# Patient Record
Sex: MALE | Race: WHITE
[De-identification: names, ages, dates, MRNs, and addresses within clinical notes are randomized per-mention and may not be internally consistent; named-entity substitution may affect disease eponyms.]

---

## 2018-10-10 ENCOUNTER — HOSPITAL ENCOUNTER (OUTPATIENT)
Dept: HOSPITAL 47 - RADXRYALE | Age: 23
Discharge: HOME | End: 2018-10-10
Attending: INTERNAL MEDICINE
Payer: COMMERCIAL

## 2018-10-10 DIAGNOSIS — M54.5: Primary | ICD-10-CM

## 2018-10-10 PROCEDURE — 72110 X-RAY EXAM L-2 SPINE 4/>VWS: CPT

## 2018-10-10 NOTE — XR
EXAMINATION TYPE: XR lumbosacral spine min 4V

 

DATE OF EXAM: 10/10/2018

 

CLINICAL HISTORY: Low back pain for one week. No known injury.

 

TECHNIQUE: Frontal, lateral, and oblique images of the lumbar spine are obtained.

 

COMPARISON: None

 

FINDINGS:  There are 5 lumbar type vertebral bodies identified.  The lumbar spine shows satisfactory 
alignment without evidence of acute fracture or dislocation. Vertebral body heights and disk space he
ights are within normal limits.   The oblique images appear within normal limits although bowel overl
ies the pars interarticularis somewhat limiting evaluation for defects.  The overlying soft tissue ap
pears unremarkable.

 

IMPRESSION:  No acute fracture or malalignment is seen in the lumbar spine.

## 2020-08-06 ENCOUNTER — HOSPITAL ENCOUNTER (EMERGENCY)
Dept: HOSPITAL 47 - EC | Age: 25
LOS: 1 days | Discharge: HOME | End: 2020-08-07
Payer: COMMERCIAL

## 2020-08-06 DIAGNOSIS — R11.2: ICD-10-CM

## 2020-08-06 DIAGNOSIS — R10.9: Primary | ICD-10-CM

## 2020-08-06 DIAGNOSIS — R31.9: ICD-10-CM

## 2020-08-06 PROCEDURE — 81001 URINALYSIS AUTO W/SCOPE: CPT

## 2020-08-06 PROCEDURE — 82365 CALCULUS SPECTROSCOPY: CPT

## 2020-08-06 PROCEDURE — 99284 EMERGENCY DEPT VISIT MOD MDM: CPT

## 2020-08-07 VITALS
DIASTOLIC BLOOD PRESSURE: 85 MMHG | RESPIRATION RATE: 18 BRPM | TEMPERATURE: 98.2 F | HEART RATE: 73 BPM | SYSTOLIC BLOOD PRESSURE: 119 MMHG

## 2020-08-07 LAB
PH UR: 6.5 [PH] (ref 5–8)
RBC UR QL: >182 /HPF (ref 0–5)
SP GR UR: 1.02 (ref 1–1.03)
UROBILINOGEN UR QL STRIP: <2 MG/DL (ref ?–2)
WBC # UR AUTO: 2 /HPF (ref 0–5)

## 2020-08-07 NOTE — ED
General Adult HPI





- General


Chief complaint: Urogenital


Stated complaint: Abdominal pain, vomiting


Time Seen by Provider: 08/07/20 00:15


Source: patient, RN notes reviewed, old records reviewed


Mode of arrival: ambulatory


Limitations: no limitations





- History of Present Illness


Initial comments: 


25-year-old male patient passed history of appendectomy procedure complaint of 

left flank pain.  Patient reports that a few hours ago he had a sudden onset of 

left flank pain.  He reports that lasted for about half on the runway.  Patient 

reports that about an hour later the pain came back and he had some nausea and 

vomiting.  Patient porcelain get emergency room he urinated and there was a 

small stone in the urine cup.  Patient is totally asymptomatic this time is 

denying any acute complaints.





Systemic: Pt denies fatigue, fever/chills, rash. Pt denies weakness, night 

sweats, weight loss. 


Neuro: Pt denies headache, visual disturbances, syncope or pre-syncope.


HEENT: Pt denies ocular discharge or irritation, otalgia, rhinorrhea, 

pharyngitis or notable lymphadenopathy. 


Cardiopulmonary: Pt denies chest pain, SOB, heart palpitations, dyspnea on exert

ion.  


Abdominal/GI: Pt denies abdominal pain, n/v/d. 


: Pt denies dysuria, burning w/ urination, frequency/urgency. Denies new onset

urinary or bowel incontinence.  


MSK: Pt denies myalgia, loss of strength or function in extremities. 


Neuro: Pt denies new onset weakness, paresthesias. 








- Related Data


                                Home Medications











 Medication  Instructions  Recorded  Confirmed


 


No Known Home Medications  07/25/15 07/25/15











                                    Allergies











Allergy/AdvReac Type Severity Reaction Status Date / Time


 


No Known Allergies Allergy   Verified 08/07/20 00:08














Review of Systems


ROS Statement: 


Those systems with pertinent positive or pertinent negative responses have been 

documented in the HPI.





ROS Other: All systems not noted in ROS Statement are negative.





Past Medical History


Past Medical History: No Reported History


History of Any Multi-Drug Resistant Organisms: None Reported


Past Surgical History: Appendectomy


Additional Past Surgical History / Comment(s): colonoscopy.


Past Psychological History: No Psychological Hx Reported


Smoking Status: Never smoker


Past Alcohol Use History: None Reported


Past Drug Use History: None Reported





- Past Family History


  ** Father


Additional Family Medical History / Comment(s): depression





General Exam





- General Exam Comments


Initial Comments: 





Constitutional: NAD, AOX3, Pt has pleasant affect. 


HEENT: NC/AT, trachea midline, neck supple, no lymphadenopathy. External ears 

appear normal, without discharge. Mucous membranes moist. EOM intact. There is 

no scleral icterus. No pallor noted. 


Cardiopulmonary: RRR, no murmurs, rubs or gallops, no JVD noted. Lungs CTAB in 

anterior and posterior fields. No peripheral edema. 


Abdominal exam: Abdomen soft and non-distended. Abdomen non-tender to palpation 

in all 4 quadrants. Bowel sounds active in LLQ. No hepatosplenomegaly. No 

ecchymosis


Neuro: CN II-XII grossly intact. No nuchal rigidity. No raccon eyes, no davila 

sign. 


MSK: Full active ROM in upper and lower extremities, 





Limitations: no limitations





Course





                                   Vital Signs











  08/07/20





  00:04


 


Temperature 98.2 F


 


Pulse Rate 73


 


Respiratory 18





Rate 


 


Blood Pressure 119/85


 


O2 Sat by Pulse 100





Oximetry 














Medical Decision Making





- Medical Decision Making











25-year-old male patient was ED chief complaint left flank pain earlier today.  

Patient is passing kidney stone emergency room.  Asymptomatic at this time.  

Physical exam did not display acute pathology.  Laboratory investigations do 

reveal blood in the urine.  Patient will be discharged with urine strainer 

urology follow-up as well as primary care prior to return to ER if condition 

worsens in any way.  Case discussed with Dr. Marcus. 








- Lab Data





                                   Lab Results











  08/07/20 Range/Units





  00:25 


 


Urine Color  Yellow  


 


Urine Appearance  Clear  (Clear)  


 


Urine pH  6.5  (5.0-8.0)  


 


Ur Specific Gravity  1.019  (1.001-1.035)  


 


Urine Protein  Trace H  (Negative)  


 


Urine Glucose (UA)  Negative  (Negative)  


 


Urine Ketones  Negative  (Negative)  


 


Urine Blood  Large H  (Negative)  


 


Urine Nitrite  Negative  (Negative)  


 


Urine Bilirubin  Negative  (Negative)  


 


Urine Urobilinogen  <2.0  (<2.0)  mg/dL


 


Ur Leukocyte Esterase  Negative  (Negative)  


 


Urine RBC  >182 H  (0-5)  /hpf


 


Urine WBC  2  (0-5)  /hpf


 


Amorphous Sediment  Rare H  (None)  /hpf


 


Urine Mucus  Moderate H  (None)  /hpf














Disposition


Clinical Impression: 


 Flank pain





Narrative: 





suspected passed kidney stone 


Disposition: HOME SELF-CARE


Condition: Stable


Instructions (If sedation given, give patient instructions):  Kidney Stones (ED)


Additional Instructions: 


Strain urine to look for stones.  If you do discover 1 please put it in the 

urine cup.  Follow up with primary care provider tomorrow.  Follow up with 

Urologist tomorrow.  Return to ER if condition worsens in any way.


Is patient prescribed a controlled substance at d/c from ED?: No


Referrals: 


Niki Culp MD [Primary Care Provider] - 1-2 days


Javad Kwok MD [STAFF PHYSICIAN] - 1-2 days